# Patient Record
Sex: FEMALE | Race: ASIAN | NOT HISPANIC OR LATINO | URBAN - METROPOLITAN AREA
[De-identification: names, ages, dates, MRNs, and addresses within clinical notes are randomized per-mention and may not be internally consistent; named-entity substitution may affect disease eponyms.]

---

## 2024-09-24 ENCOUNTER — EMERGENCY (EMERGENCY)
Facility: HOSPITAL | Age: 54
LOS: 0 days | Discharge: HOME | End: 2024-09-24
Attending: STUDENT IN AN ORGANIZED HEALTH CARE EDUCATION/TRAINING PROGRAM
Payer: MEDICAID

## 2024-09-24 VITALS
HEIGHT: 64 IN | DIASTOLIC BLOOD PRESSURE: 88 MMHG | TEMPERATURE: 98 F | RESPIRATION RATE: 18 BRPM | WEIGHT: 179.9 LBS | SYSTOLIC BLOOD PRESSURE: 134 MMHG | OXYGEN SATURATION: 97 % | HEART RATE: 73 BPM

## 2024-09-24 VITALS
DIASTOLIC BLOOD PRESSURE: 78 MMHG | RESPIRATION RATE: 18 BRPM | HEART RATE: 68 BPM | OXYGEN SATURATION: 98 % | SYSTOLIC BLOOD PRESSURE: 122 MMHG | TEMPERATURE: 98 F

## 2024-09-24 DIAGNOSIS — Z98.891 HISTORY OF UTERINE SCAR FROM PREVIOUS SURGERY: Chronic | ICD-10-CM

## 2024-09-24 DIAGNOSIS — S91.311A LACERATION WITHOUT FOREIGN BODY, RIGHT FOOT, INITIAL ENCOUNTER: ICD-10-CM
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
## 2024-09-24 DIAGNOSIS — M79.671 PAIN IN RIGHT FOOT: ICD-10-CM
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
## 2024-09-24 RX ORDER — IBUPROFEN 600 MG
600 TABLET ORAL ONCE
Refills: 0 | Status: COMPLETED | OUTPATIENT
Start: 2024-09-24 | End: 2024-09-24

## 2024-09-24 RX ORDER — TETANUS TOXOID, REDUCED DIPHTHERIA TOXOID AND ACELLULAR PERTUSSIS VACCINE, ADSORBED 5; 2.5; 8; 8; 2.5 [IU]/.5ML; [IU]/.5ML; UG/.5ML; UG/.5ML; UG/.5ML
0.5 SUSPENSION INTRAMUSCULAR ONCE
Refills: 0 | Status: COMPLETED | OUTPATIENT
Start: 2024-09-24 | End: 2024-09-24

## 2024-09-24 RX ADMIN — TETANUS TOXOID, REDUCED DIPHTHERIA TOXOID AND ACELLULAR PERTUSSIS VACCINE, ADSORBED 0.5 MILLILITER(S): 5; 2.5; 8; 8; 2.5 SUSPENSION INTRAMUSCULAR at 17:30

## 2024-09-24 RX ADMIN — Medication 600 MILLIGRAM(S): at 17:29

## 2024-09-24 NOTE — ED PROVIDER NOTE - PHYSICAL EXAMINATION
General: Well appearing female in no acute distress  HEENT: Normocephalic, atraumatic. Moist mucous membranes. Oropharynx clear. No lymphadenopathy.  Eyes: No scleral icterus. EOMI. EDY.  Neck:. Soft and supple. Full ROM without pain. No midline tenderness  Cardiac: Regular rate and regular rhythm. No murmurs, rubs, gallops. Peripheral pulses 2+ and symmetric. No LE edema.  Resp: Lungs CTAB. Speaking in full sentences. No wheezes, rales or rhonchi.  Abd: Soft, non-tender, non-distended. No guarding or rebound. No scars, masses, or lesions.  Back: Spine midline and non-tender. No CVA tenderness.    Skin: +0.5 superficial laceration to the dorsum of the R foot.   Neuro: AO x 3. Moves all extremities symmetrically. Motor strength and sensation grossly intact.

## 2024-09-24 NOTE — ED PROVIDER NOTE - CLINICAL SUMMARY MEDICAL DECISION MAKING FREE TEXT BOX
55 y/o F presents w/ foot injury. states she was cooking and dropped knife onto top of her R foot. occurred 2.5 hours to arrival. unknown tetanus status. endorsing localized pain only to that area. denies numbness/tingling. denies fever/chills.   xray - r/o fracture , low clinical suspicion  patient declined sutures , asked multiple times . 0.5 superficial laceration to dorsum of foot. neurovascularly intact. does appear to have hematoma where laceration is, edematous to the area.   full ROM of the RLE.   adacel,  pain meds.  dermabond for laceration repair

## 2024-09-24 NOTE — ED PROVIDER NOTE - PROGRESS NOTE DETAILS
Colletta NP- received pt in [place].   53 y/o F Dropped a knife on dorsal aspect of right foot.   Unsure of last tdap. Endorsing pain to foot but no difficulty ambulating.  Denies numbness, other complaint.     Gen: NAD, AOx3, able to make needs known, non-toxic  Head: NCAT  HEENT: EOMI, oral mucosa moist, normal conjunctiva  Lung: CTAB, no respiratory distress, no wheezes/rhonchi/rales B/L, speaking in full sentences  CV: RRR, no murmurs  Abd: non distended, soft, nontender, no guarding, no CVA tenderness  MSK: no visible deformities  Neuro: Appears non focal  Skin: 0.5cm linear lac to dorsal aspect rt foot with surrounding swelling  Psych: normal affect     Xray with soft tissue swelling, pt declining suture repair, wants dermabond. Wound irrigated and compressive ace bandage applied.   Red flag signs and symptoms discussed as well as strict return precautions given, pt verbalized understanding.

## 2024-09-24 NOTE — ED ADULT NURSE NOTE - CHIEF COMPLAINT QUOTE
c/o R foot pain s/p cutting raw meat and knife fell on foot. Hematoma noted to top of foot, no active bleeding at this time. Unknown last tetanus. [facility] denies.

## 2024-09-24 NOTE — ED PROVIDER NOTE - OBJECTIVE STATEMENT
55 y/o F presents w/ foot injury. states she was cooking and dropped knife onto top of her R foot. occurred 2.5 hours to arrival. unknown tetanus status. endorsing localized pain only to that area. denies numbness/tingling. denies fever/chills.

## 2024-09-24 NOTE — ED ADULT TRIAGE NOTE - CHIEF COMPLAINT QUOTE
c/o R foot pain s/p cutting raw meat and knife fell on foot. Hematoma noted to top of foot, no active bleeding at this time. Unknown last tetanus. [facility] denies.

## 2024-09-24 NOTE — ED PROVIDER NOTE - ATTENDING APP SHARED VISIT CONTRIBUTION OF CARE
53 y/o F presents w/ foot injury. states she was cooking and dropped knife onto top of her R foot. occurred 2.5 hours to arrival. unknown tetanus status. endorsing localized pain only to that area. denies numbness/tingling. denies fever/chills.   xray - r/o fracture , low clinical suspicion  patient declined sutures , asked multiple times . 0.5 superficial laceration to dorsum of foot. neurovascularly intact. does appear to have hematoma where laceration is, edematous to the area.   full ROM of the RLE.   adacel,  pain meds.   will use dermabond.     I performed a history and physical exam of the patient and discussed their management with the PERFECTO. I have reviewed the PERFECTO note and agree with the documented findings and plan of care, except as noted. This was a shared visit with an PERFECTO. I reviewed and verified the documentation and independently performed my own history/exam/medical decision making. My medical decision making and observations are found above. Please refer to any progress notes for updates on clinical course.

## 2024-09-24 NOTE — ED PROVIDER NOTE - PATIENT PORTAL LINK FT
You can access the FollowMyHealth Patient Portal offered by [facility] by registering at the following website: [email or web address]. By joining [employer]’s FollowMyHealth portal, you will also be able to view your health information using other applications (apps) compatible with our system.

## 2024-09-24 NOTE — ED PROVIDER NOTE - CARE PLAN
Principal Discharge DX:	Injury of right foot  Secondary Diagnosis:	Laceration of skin of right foot   1

## 2024-09-24 NOTE — ED PROVIDER NOTE - NSFOLLOWUPINSTRUCTIONS_ED_ALL_ED_FT
1) Follow up with your doctor as discussed  2) Return to the ED immediately for new or worsening symptoms   3) Please continue to take any home medications as prescribed  4) Your test results from your ED visit were discussed with you prior to discharge  5) You were provided with a copy of your test results  6) You may take 600mg Motrin (with food) 975mg Tylenol every 8 hours as needed for pain    1) Lul un seguimiento con herrera médico según lo comentado.  2) Regrese al servicio de urgencias inmediatamente si los síntomas aparecen o empeoran.   3) Continúe tomando los medicamentos caseros según lo recetado.  4) Los resultados de las pruebas de herrera visita al servicio de urgencias se analizaron con usted antes del sonia.  5) Se le proporcionó kamran copia de los resultados de herrera prueba.  6) Puede marine 600 mg de Motrin (con alimentos) 975 mg de Tylenol cada 8 horas según sea necesario para el dolor.    Cuidados de kamran herida tratada con adhesivo para tejidos  Tissue Adhesive Wound Care  Algunos dickens, heridas, laceraciones e incisiones pueden repararse con un adhesivo para tejidos, también denominado goma para cerrar la piel. Caroline adhesivo mantiene unida la piel para que pueda cicatrizar más rápidamente. En aproximadamente 1 minuto, adhiere con firmeza la piel, y alcanza herrera fuerza máxima en, aproximadamente, 2 o 3 minutos. El adhesivo desaparece solo mientras la herida cicatriza. Es importante cuidar thony herrera herida mientras cicatriza.    Siga estas instrucciones en herrera casa:  Cuidados de la herida    Washing hands with soap and water.  Two wounds closed with skin glue. One is normal. The other is red with pus and infected.  Si le baxter colocado kamran venda (vendaje), manténgala limpia y seca.  Siga las instrucciones del médico respecto a la frecuencia con la que debe cambiar el vendaje. Asegúrese de hacer lo siguiente:  Lávese las [place] con agua y jabón anette al menos 20 segundos antes y después de cambiar el vendaje. Use desinfectante para [place] si no dispone de agua y jabón.  Cambie el vendaje nanette se lo haya indicado el médico.  Deje el adhesivo para tejidos colocado. Se saldrá por sí solo después de 7 a 10 días.  No rasque, no frote ni raspe el adhesivo.  No coloque cinta sobre el adhesivo. El adhesivo podría despegarse de la herida al quitar la cinta.  Revise la herida todos los días para asegurarse de que no esté comenzando a abrirse nuevamente.  Proteja la herida de nuevas lesiones hasta que se cure.  Controle la linda de la herida todos los días para detectar signos de infección. Esté atento a los siguientes signos:  Aumento del enrojecimiento, la hinchazón o el dolor.  Líquido o ronnie.  Calor o erupción cutánea alrededor de la herida.  Pus o mal olor.  Kamran dureza o un bulto que no proviene del adhesivo.  Peter    No tome peter de inmersión, no nade ni use el jacuzzi hasta que el médico lo autorice. Pregúntele al médico si puede ducharse. Ronny vez solo le permitan darse peter de esponja.  Por lo general, puede ducharse después de las primeras 24 horas.  Cubra el vendaje con un envoltorio impermeable cuando se duche.  No debe empapar la linda donde se colocó el adhesivo para tejidos.  No use jabones, productos con vaselina ni ungüentos en la herida. Algunos ungüentos pueden debilitar el adhesivo.  Comida y bebida    Consuma alimentos saludables para ayudar a que la herida cicatrice. [place] se lo haya indicado el médico, coma alimentos con alto contenido de:  Proteína. Estos alimentos incluyen carne, pescado, huevos, productos lácteos, frijoles y nueces.  Vitamina A. Estos alimentos incluyen zanahorias y verduras de hoja de color renetta oscuro.  Vitamina C. Estos alimentos incluyen cítricos, tomates, brócoli y pimientos.  Le suficiente líquido nanette para mantener la orina de color amarillo pálido.  Instrucciones generales    Proteja la herida del sol anette los primeros 6 meses o anette el tiempo que le haya indicado el médico. Aplíquese pantalla solar con un factor de protección solar (FPS) de 30 o más alto alrededor de la cicatriz, o cúbrala.  Use los medicamentos de venta claude y los recetados solamente nanette se lo haya indicado el médico.  No consuma ningún producto que contenga nicotina o tabaco. Estos productos incluyen cigarrillos, tabaco para mascar y aparatos de vapeo, nanette los cigarrillos electrónicos. Estos pueden retrasar la cicatrización de la herida. Si necesita ayuda para dejar de fumar, consulte al médico.  Concurra a todas las visitas de seguimiento. [place] es importante.  Comuníquese con un médico si:  El adhesivo para tejido se empapa con ronnie o se  antes de que la herida haya cicatrizado. Es posible que haya que cambiar el adhesivo.  Tiene fiebre o escalofríos.  Tiene enrojecimiento, hinchazón o dolor alrededor de la herida.  Observa líquido o ronnie que sale de la herida.  Aparece kamran erupción kamran vez aplicado el adhesivo.  Tiene kamran dureza alrededor del lugar de la herida.  Solicite ayuda de inmediato si:  La herida vuelve a abrirse.  Tiene kamran línea catalino en la linda alrededor de la herida.  Tiene pus o percibe mal olor que emana de la herida.  Resumen  El adhesivo desaparece solo mientras la herida cicatriza. Es importante cuidar thony herrera herida en el hogar mientras cicatriza.  Siempre lávese las [place] anette al menos 20 segundos con agua y jabón antes y después de cambiar la venda (vendaje).  Para ayudar con la cicatrización, coma alimentos con alto contenido de proteínas, vitamina A y vitamina C.  Controle la linda de la herida todos los días para detectar signos de infección.  Esta información no tiene nanette fin reemplazar el consejo del médico. Asegúrese de hacerle al médico cualquier pregunta que tenga.